# Patient Record
(demographics unavailable — no encounter records)

---

## 2024-12-11 NOTE — REASON FOR VISIT
[Follow - Up] : a follow-up visit [Hypothyroidism] : hypothyroidism [Osteoporosis] : osteoporosis [Home] : at home, [unfilled] , at the time of the visit. [Medical Office: ([facility])___] : at the medical office located in  [Verbal consent obtained from patient] : the patient, [unfilled]

## 2024-12-11 NOTE — ASSESSMENT
[FreeTextEntry1] : This is a 60-year-old female with primary hypothyroidism, osteoporosis, psoriasis, breast cancer status post bilateral mastectomy, GERD, fundoplication, hypertension, interstitial cystitis, kidney stones, thyroid nodule, here for a follow-up.  1. Primary hypothyroidism She is currently on Synthroid 50 mcg on Monday through Friday and 100 mcg on Saturdays and Sundays. Check TFTs.  2. Osteoporosis Would avoid PO bisphosphonates given history of GERD/fundoplication. She has been on Prolia since February 2021. She missed her dose in February 2023 as Prolia was not covered. She took Prolia dose on July 28, 2023 and January 28th, 2024. Last Prolia dose June 28th, 2024. Check CMP and 25 vitamin D. If normal, will proceed with Prolia.  There is no history of bone fracture in the past. Not on calcium supplements due to history of kidney stones and hypercalcemia. She takes vitamin D3 2000 IU daily.  Last dental exam June 2024. No dental work anticipated. DEXA from June 2022 showed L1-L4 T-score -1.5 (6% significant increase form previous), left femoral neck T-score -2.3. DEXA from July 2024 showed L1-L4 T-score -0.9 (6.8% increase from previous), left femoral neck T-score -2.3 (1.4% increase from previous), left total hip T-score -2.3 (1.6 increase from previous). FRAX major 9.9%, hip 1.6% Check DEXA in July 2026.  3. Thyroid nodule  Last thyroid ultrasound in October 2023 showed stable hypoechoic nodule in the right midpole.   Check thyroid ultrasound in October 2025.

## 2024-12-11 NOTE — ADDENDUM
[FreeTextEntry1] :  By signing my name below, I, Hannah Collins, attest that this document has been prepared under the direction and in the presence of Dr. Rubalcava.  I, Qing Rubalcava MD, personally performed the services described in this documentation. All medical record entries made by the scribe were at my discretion and in my presence. I have reviewed the chart and discharge instructions (if applicable) and agree that the record reflects my personal permanence and is accurate and complete.

## 2024-12-11 NOTE — HISTORY OF PRESENT ILLNESS
[FreeTextEntry1] : CC: Osteoporosis This is a 60-year-old female with primary hypothyroidism, osteoporosis, psoriasis, breast cancer status post bilateral mastectomy, GERD, fundoplication, hypertension, interstitial cystitis, kidney stones, thyroid nodule, here for a follow-up.  She is currently on Synthroid 50 mcg on Monday through Friday and 100 mcg on Saturdays and Sundays. Reports constipation.   Menarche was at the age of 16.  Menopause was at the age of 52. She has been on Prolia since February 2021. She has been on Prolia since February 2021. She missed her dose in February 2023 as Prolia was not covered. She took Prolia dose on July 28, 2023 and January 28th, 2024. Last Prolia dose June 28th, 2024. There is no history of bone fracture in the past. Not on calcium supplements due to history of kidney stones and hypercalcemia.  She takes vitamin D3 2000 IU daily.  Last dental exam June 2024. No dental work anticipated.  DEXA from June 2022 sowed L1-L4 T-score -1.5 (6% significant increase from previous) left femoral neck T-score -2.3.  DEXA from July 2024 showed L1-L4 T-score -0.9 (6.8% increase from previous), left femoral neck T-score -2.3 (1.4% increase from previous), left total hip T-score -2.3 (1.6 increase from previous). FRAX major 9.9%, hip 1.6%

## 2025-07-03 NOTE — ASSESSMENT
[FreeTextEntry1] : This is a 60-year-old female with primary hypothyroidism, osteopenia, psoriasis, breast cancer status post bilateral mastectomy, GERD, fundoplication, hypertension, interstitial cystitis, kidney stones, thyroid nodule, here for a follow-up.  1. Primary hypothyroidism She is currently on Synthroid 50 mcg on Monday through Friday and 100 mcg on Saturdays and Sundays. Check TFTs.  2. Osteopenia Would avoid PO bisphosphonates given history of GERD/fundoplication. She has been on Prolia since February 2021. She missed her dose in February 2023 as Prolia was not covered.  There is no history of bone fracture in the past. Not on calcium supplements due to history of kidney stones and hypercalcemia. She takes vitamin D3 2000 IU daily Occasionally.  Last dental exam was February 2025.No dental work anticipated. DEXA from June 2022 showed L1-L4 T-score -1.5 (6% significant increase form previous), left femoral neck T-score -2.3. DEXA from July 2024 showed L1-L4 T-score -0.9 (6.8% increase from previous), left femoral neck T-score -2.3 (1.4% increase from previous), left total hip T-score -2.3 (1.6 increase from previous). FRAX major 9.9%, hip 1.6% Check DEXA in July 2026.  Check CMP and 25 vitamin D. If normal, will proceed with Prolia. Tx options reviewed. Elects to cont with Prolia at this time.  Last Prolia dose was January 2025. 3. Thyroid nodule  Last thyroid ultrasound in October 2023 showed stable hypoechoic nodule in the right midpole.   Check thyroid ultrasound in October 2025.

## 2025-07-03 NOTE — HISTORY OF PRESENT ILLNESS
[FreeTextEntry1] : CC: Osteoporosis This is a 60-year-old female with primary hypothyroidism, osteopenia, psoriasis, breast cancer status post bilateral mastectomy, GERD, fundoplication, hypertension, interstitial cystitis, kidney stones, thyroid nodule, here for a follow-up.  She is currently on Synthroid 50 mcg on Monday through Friday and 100 mcg on Saturdays and Sundays. Reports constipation.  Menarche was at the age of 16.  Menopause was at the age of 52. She has been on Prolia since February 2021. She missed her dose in February 2023 as Prolia was not covered.                Last Prolia dose was January 2025.   There is no history of bone fracture in the past. Not on calcium supplements due to history of kidney stones and hypercalcemia.  She takes vitamin D3 2000 IU daily.  Last dental exam February 2025. No dental work anticipated.  DEXA from June 2022 sowed L1-L4 T-score -1.5 (6% significant increase from previous) left femoral neck T-score -2.3.  DEXA from July 2024 showed L1-L4 T-score -0.9 (6.8% increase from previous), left femoral neck T-score -2.3 (1.4% increase from previous), left total hip T-score -2.3 (1.6 increase from previous). FRAX major 9.9%, hip 1.6%